# Patient Record
Sex: FEMALE | Race: WHITE | NOT HISPANIC OR LATINO
[De-identification: names, ages, dates, MRNs, and addresses within clinical notes are randomized per-mention and may not be internally consistent; named-entity substitution may affect disease eponyms.]

---

## 2019-08-12 ENCOUNTER — RECORD ABSTRACTING (OUTPATIENT)
Age: 69
End: 2019-08-12

## 2019-08-12 DIAGNOSIS — F17.200 NICOTINE DEPENDENCE, UNSPECIFIED, UNCOMPLICATED: ICD-10-CM

## 2019-08-12 DIAGNOSIS — M71.569 OTHER ENTHESOPATHIES, NOT ELSEWHERE CLASSIFIED: ICD-10-CM

## 2019-08-12 DIAGNOSIS — M77.8 OTHER ENTHESOPATHIES, NOT ELSEWHERE CLASSIFIED: ICD-10-CM

## 2019-08-12 DIAGNOSIS — Z82.5 FAMILY HISTORY OF ASTHMA AND OTHER CHRONIC LOWER RESPIRATORY DISEASES: ICD-10-CM

## 2019-08-12 DIAGNOSIS — Z87.39 PERSONAL HISTORY OF OTHER DISEASES OF THE MUSCULOSKELETAL SYSTEM AND CONNECTIVE TISSUE: ICD-10-CM

## 2019-08-12 DIAGNOSIS — Z72.89 OTHER PROBLEMS RELATED TO LIFESTYLE: ICD-10-CM

## 2019-08-12 DIAGNOSIS — Z80.9 FAMILY HISTORY OF MALIGNANT NEOPLASM, UNSPECIFIED: ICD-10-CM

## 2019-08-12 DIAGNOSIS — E78.5 HYPERLIPIDEMIA, UNSPECIFIED: ICD-10-CM

## 2019-08-12 PROBLEM — Z00.00 ENCOUNTER FOR PREVENTIVE HEALTH EXAMINATION: Status: ACTIVE | Noted: 2019-08-12

## 2019-08-12 RX ORDER — IBUPROFEN 200 MG/1
TABLET, COATED ORAL
Refills: 0 | Status: ACTIVE | COMMUNITY

## 2019-08-13 ENCOUNTER — APPOINTMENT (OUTPATIENT)
Dept: RHEUMATOLOGY | Facility: CLINIC | Age: 69
End: 2019-08-13
Payer: MEDICARE

## 2019-08-13 VITALS
WEIGHT: 154 LBS | BODY MASS INDEX: 28.34 KG/M2 | HEIGHT: 62 IN | DIASTOLIC BLOOD PRESSURE: 64 MMHG | SYSTOLIC BLOOD PRESSURE: 112 MMHG

## 2019-08-13 PROCEDURE — 20605 DRAIN/INJ JOINT/BURSA W/O US: CPT | Mod: 50,LT,RT

## 2019-08-13 PROCEDURE — 99203 OFFICE O/P NEW LOW 30 MIN: CPT | Mod: 25

## 2019-08-13 RX ADMIN — TRIAMCINOLONE ACETONIDE 0 MG/ML: 40 INJECTION, SUSPENSION INTRA-ARTICULAR; INTRAMUSCULAR at 00:00

## 2019-08-13 NOTE — PHYSICAL EXAM
[General Appearance - Alert] : alert [General Appearance - In No Acute Distress] : in no acute distress [General Appearance - Well Developed] : well developed [General Appearance - Well Nourished] : well nourished [Motor Exam] : the motor exam was normal [] : no rash [FreeTextEntry1] : There is no active synovitis. Finkelstein test is positive bilaterally, with some palpable swelling over the right expensor pollicus longus tendon.

## 2019-08-13 NOTE — REASON FOR VISIT
[Initial Evaluation] : an initial evaluation [Spouse] : spouse [FreeTextEntry1] : Bilateral wrist pain

## 2019-08-13 NOTE — REVIEW OF SYSTEMS
[Joint Pain] : joint pain [Joint Stiffness] : joint stiffness [Fever] : no fever [Chills] : no chills [Eye Pain] : no eye pain [Red Eyes] : eyes not red [Abdominal Pain] : no abdominal pain [Diarrhea] : no diarrhea [Joint Swelling] : no joint swelling [Dysuria] : no dysuria [Fainting] : no fainting [Limb Weakness] : no limb weakness

## 2019-08-13 NOTE — PROCEDURE
[FreeTextEntry1] : Lateral aspect of left wrist prepped with betadine; 1% lido applied to subQ tissue; 1cc 1% lido and 1cc Kenalog 40 940 mg) injected in the area of the extensor pollicus longus tendon at the level of the wrist with good results and no complications. \par \par Lateral aspect of the right wrist prepped with betadine, 1% lido applied to subQ tissue; 1cc 1% lido and 1cc Kenalog 40 (40 mg) injected into area of extensor pollicus longus at the level of the wrist with good results and no complications.

## 2019-08-13 NOTE — HISTORY OF PRESENT ILLNESS
[FreeTextEntry1] : Patient reports about 4 to 6 weeks of bilateral wrist pain (points to lateral aspect of both wrists along the extensor pollicus longus tendons). Patient is a , and pain is adversely affecting her employment.

## 2019-08-19 RX ORDER — TRIAMCINOLONE ACETONIDE 40 MG/ML
40 SUSPENSION INTRA-ARTERIAL; INTRAMUSCULAR
Qty: 1 | Refills: 0 | Status: COMPLETED
Start: 2019-08-13

## 2019-08-19 RX ORDER — TRIAMCINOLONE ACETONIDE 40 MG/ML
40 SUSPENSION INTRA-ARTERIAL; INTRAMUSCULAR
Qty: 1 | Refills: 0 | Status: COMPLETED | OUTPATIENT
Start: 2019-08-13

## 2019-11-26 ENCOUNTER — RX RENEWAL (OUTPATIENT)
Age: 69
End: 2019-11-26

## 2019-11-26 DIAGNOSIS — L40.9 PSORIASIS, UNSPECIFIED: ICD-10-CM

## 2020-03-09 ENCOUNTER — APPOINTMENT (OUTPATIENT)
Dept: RHEUMATOLOGY | Facility: CLINIC | Age: 70
End: 2020-03-09

## 2020-03-16 ENCOUNTER — APPOINTMENT (OUTPATIENT)
Dept: RHEUMATOLOGY | Facility: CLINIC | Age: 70
End: 2020-03-16
Payer: MEDICARE

## 2020-03-16 PROCEDURE — 99213 OFFICE O/P EST LOW 20 MIN: CPT | Mod: 25

## 2020-03-16 PROCEDURE — 20610 DRAIN/INJ JOINT/BURSA W/O US: CPT

## 2020-03-16 RX ADMIN — Medication 0 MG/ML: at 00:00

## 2020-03-16 NOTE — PROCEDURE
[FreeTextEntry1] : Right shoulder prepped with betadine; 2% lido applied to subQ tissue; 2cc 2% lido and 3cc Kenalog 40 (120 mg) injected into area of right subacromial bursa, and also into the vicinity of the right supraspinatus tendon with good results and no complications.

## 2020-03-16 NOTE — PHYSICAL EXAM
[General Appearance - Alert] : alert [General Appearance - In No Acute Distress] : in no acute distress [General Appearance - Well Nourished] : well nourished [General Appearance - Well Developed] : well developed [] : no rash [Motor Exam] : the motor exam was normal [FreeTextEntry1] : There is no right GH tenderness or swelling. There is pain on passive abduction of the right shoulder to about 80 degrees - this reproduces patient's CC. Also, there is pain on stressing the right rotator cuff - this also reporduces patient's CC.

## 2020-03-16 NOTE — REVIEW OF SYSTEMS
[Joint Pain] : joint pain [Joint Stiffness] : joint stiffness [Fever] : no fever [Chills] : no chills [Eye Pain] : no eye pain [Red Eyes] : eyes not red [Abdominal Pain] : no abdominal pain [Diarrhea] : no diarrhea [Dysuria] : no dysuria [Joint Swelling] : no joint swelling [Fainting] : no fainting [Limb Weakness] : no limb weakness

## 2020-03-16 NOTE — HISTORY OF PRESENT ILLNESS
[FreeTextEntry1] : Patient comes in with about two months of right shoulder pain, especially while raising her right arm over her head.

## 2020-04-09 RX ORDER — TRIAMCINOLONE ACETONIDE 40 MG/ML
40 SUSPENSION INTRA-ARTERIAL; INTRAMUSCULAR
Qty: 1 | Refills: 0 | Status: COMPLETED | OUTPATIENT
Start: 2020-03-16

## 2020-04-09 RX ORDER — TRIAMCINOLONE ACETONIDE 40 MG/ML
40 SUSPENSION INTRA-ARTERIAL; INTRAMUSCULAR
Qty: 1 | Refills: 0 | Status: COMPLETED
Start: 2020-03-16

## 2020-05-13 ENCOUNTER — TRANSCRIPTION ENCOUNTER (OUTPATIENT)
Age: 70
End: 2020-05-13

## 2020-10-13 ENCOUNTER — TRANSCRIPTION ENCOUNTER (OUTPATIENT)
Age: 70
End: 2020-10-13

## 2021-05-09 RX ORDER — HYDROCORTISONE VALERATE 2 MG/G
0.2 CREAM TOPICAL
Qty: 1 | Refills: 5 | Status: ACTIVE | COMMUNITY
Start: 2019-11-26 | End: 1900-01-01

## 2021-06-09 ENCOUNTER — APPOINTMENT (OUTPATIENT)
Dept: RHEUMATOLOGY | Facility: CLINIC | Age: 71
End: 2021-06-09
Payer: MEDICARE

## 2021-06-09 VITALS
DIASTOLIC BLOOD PRESSURE: 68 MMHG | TEMPERATURE: 98.1 F | HEIGHT: 62 IN | SYSTOLIC BLOOD PRESSURE: 132 MMHG | WEIGHT: 160 LBS | BODY MASS INDEX: 29.44 KG/M2

## 2021-06-09 PROCEDURE — 99213 OFFICE O/P EST LOW 20 MIN: CPT | Mod: 25

## 2021-06-09 PROCEDURE — 20610 DRAIN/INJ JOINT/BURSA W/O US: CPT

## 2021-06-09 NOTE — PHYSICAL EXAM
[General Appearance - Alert] : alert [General Appearance - In No Acute Distress] : in no acute distress [General Appearance - Well Nourished] : well nourished [General Appearance - Well Developed] : well developed [] : no rash [Motor Exam] : the motor exam was normal [FreeTextEntry1] : There is no right GH tenderness or swelling. There is pain on passive abduction of the right shoulder to about 80 degrees - this reproduces patient's CC.

## 2021-06-09 NOTE — PROCEDURE
[FreeTextEntry1] : Right shoulder prepped with betadine; 2% lido applied to subQ tissue; 2cc 2% lido and 3cc Kenalog 40 (120 mg) injected into area of right subacromial bursa with good results and no complications.

## 2021-06-09 NOTE — HISTORY OF PRESENT ILLNESS
[FreeTextEntry1] : Patient comes in requesting repeat steroid injection for recurrence of symptoms suggesting right subacromial bursitis. Last injection was done March 2020 and was very effective.

## 2021-09-10 ENCOUNTER — APPOINTMENT (OUTPATIENT)
Dept: RHEUMATOLOGY | Facility: CLINIC | Age: 71
End: 2021-09-10
Payer: MEDICARE

## 2021-09-10 VITALS
DIASTOLIC BLOOD PRESSURE: 66 MMHG | TEMPERATURE: 98.1 F | OXYGEN SATURATION: 99 % | SYSTOLIC BLOOD PRESSURE: 128 MMHG | BODY MASS INDEX: 29.44 KG/M2 | WEIGHT: 160 LBS | HEIGHT: 62 IN | HEART RATE: 87 BPM

## 2021-09-10 PROCEDURE — 20605 DRAIN/INJ JOINT/BURSA W/O US: CPT

## 2021-09-10 PROCEDURE — 99212 OFFICE O/P EST SF 10 MIN: CPT | Mod: 25

## 2021-09-10 RX ORDER — TRIAMCINOLONE ACETONIDE 40 MG/ML
40 SUSPENSION INTRA-ARTERIAL; INTRAMUSCULAR
Qty: 1 | Refills: 0 | Status: COMPLETED | OUTPATIENT
Start: 2021-09-10 | End: 2021-09-11

## 2021-09-10 RX ADMIN — TRIAMCINOLONE ACETONIDE 0 MG/ML: 40 INJECTION, SUSPENSION INTRA-ARTICULAR; INTRAMUSCULAR at 00:00

## 2021-09-10 NOTE — PHYSICAL EXAM
[General Appearance - Alert] : alert [General Appearance - In No Acute Distress] : in no acute distress [General Appearance - Well Nourished] : well nourished [General Appearance - Well Developed] : well developed [] : no rash [Motor Exam] : the motor exam was normal [FreeTextEntry1] : Ther eis no acute synovitis. There is a positive Finkelstien test on the left.

## 2021-09-10 NOTE — HISTORY OF PRESENT ILLNESS
[FreeTextEntry1] : Here because of symptoms of left DeQuervain's recurring and is requesting a steroid injection.

## 2021-09-10 NOTE — PROCEDURE
[FreeTextEntry1] : Left wrist prepped with betadine; 1% lido applied to subQ tissue; 1/2 cc 1% lido and 1 cc Kenalog 40 (40 mg) injected into the area in the vicinity of the extensor pollicus longus with good results and no complications.

## 2021-10-25 ENCOUNTER — APPOINTMENT (OUTPATIENT)
Dept: RHEUMATOLOGY | Facility: CLINIC | Age: 71
End: 2021-10-25
Payer: MEDICARE

## 2021-10-25 PROCEDURE — 99213 OFFICE O/P EST LOW 20 MIN: CPT | Mod: 25

## 2021-10-25 PROCEDURE — 20610 DRAIN/INJ JOINT/BURSA W/O US: CPT

## 2021-10-25 NOTE — HISTORY OF PRESENT ILLNESS
[FreeTextEntry1] : Having symptoms of recurrence of right subacromial bursitis; requesting repeat steroid injection. Last one done in June was very effective per patient.

## 2022-01-01 DIAGNOSIS — B00.1 HERPESVIRAL VESICULAR DERMATITIS: ICD-10-CM

## 2022-01-05 ENCOUNTER — TRANSCRIPTION ENCOUNTER (OUTPATIENT)
Age: 72
End: 2022-01-05

## 2022-02-07 ENCOUNTER — APPOINTMENT (OUTPATIENT)
Dept: RHEUMATOLOGY | Facility: CLINIC | Age: 72
End: 2022-02-07
Payer: MEDICARE

## 2022-02-07 ENCOUNTER — RESULT REVIEW (OUTPATIENT)
Age: 72
End: 2022-02-07

## 2022-02-07 VITALS
DIASTOLIC BLOOD PRESSURE: 60 MMHG | WEIGHT: 173 LBS | HEART RATE: 71 BPM | HEIGHT: 62 IN | OXYGEN SATURATION: 93 % | SYSTOLIC BLOOD PRESSURE: 110 MMHG | BODY MASS INDEX: 31.83 KG/M2

## 2022-02-07 DIAGNOSIS — M65.4 RADIAL STYLOID TENOSYNOVITIS [DE QUERVAIN]: ICD-10-CM

## 2022-02-07 DIAGNOSIS — M85.811: ICD-10-CM

## 2022-02-07 PROCEDURE — 20610 DRAIN/INJ JOINT/BURSA W/O US: CPT

## 2022-02-07 PROCEDURE — 99214 OFFICE O/P EST MOD 30 MIN: CPT | Mod: 25

## 2022-02-07 RX ORDER — TRIAMCINOLONE ACETONIDE 40 MG/ML
40 SUSPENSION INTRA-ARTERIAL; INTRAMUSCULAR
Qty: 1 | Refills: 0 | Status: COMPLETED | OUTPATIENT
Start: 2022-02-07 | End: 2022-02-08

## 2022-02-07 RX ORDER — TRIAMCINOLONE ACETONIDE 40 MG/ML
40 SUSPENSION INTRA-ARTERIAL; INTRAMUSCULAR
Qty: 1 | Refills: 0 | Status: COMPLETED
Start: 2022-02-07 | End: 2022-02-08

## 2022-02-07 RX ORDER — CLOBETASOL PROPIONATE 0.5 MG/G
0.05 GEL TOPICAL
Refills: 0 | Status: DISCONTINUED | COMMUNITY
End: 2022-02-07

## 2022-02-07 RX ADMIN — TRIAMCINOLONE ACETONIDE 0 MG/ML: 40 INJECTION, SUSPENSION INTRA-ARTICULAR; INTRAMUSCULAR at 00:00

## 2022-02-07 NOTE — PHYSICAL EXAM
[General Appearance - Alert] : alert [General Appearance - In No Acute Distress] : in no acute distress [General Appearance - Well Nourished] : well nourished [General Appearance - Well Developed] : well developed [] : no rash [Motor Exam] : the motor exam was normal [FreeTextEntry1] : There is no right GH tenderness or swelling. There is no pain on passive abduction of the right shoulder to about 100 degrees. There is tenderness of the proximal right biceps tendon - this reproduces patient's CC. There is pain on stressing the right rotator cuff - this also reproduces patient's CC.

## 2022-02-07 NOTE — PROCEDURE
[FreeTextEntry1] : Right shoulder prepped with betadine; 2% lido applied to subQ tissue; 2cc 2% lido and 3cc Kenalog 40 (80 mg) injected in the vicinity of the right supraspinatus tendon (rotator cuff) with good results and no complications. \par \par Right upper arm prepped with betadine; 1% lido applied to subQ tissue; 2cc 1% lido and 2cc Kenalog 40 980 mg) injected in the vicinity of the proximal right long head biceps tendon with good results and no complications.

## 2022-02-23 ENCOUNTER — APPOINTMENT (OUTPATIENT)
Dept: RADIOLOGY | Facility: CLINIC | Age: 72
End: 2022-02-23
Payer: MEDICARE

## 2022-02-23 ENCOUNTER — OUTPATIENT (OUTPATIENT)
Dept: OUTPATIENT SERVICES | Facility: HOSPITAL | Age: 72
LOS: 1 days | End: 2022-02-23
Payer: MEDICARE

## 2022-02-23 ENCOUNTER — RESULT REVIEW (OUTPATIENT)
Age: 72
End: 2022-02-23

## 2022-02-23 DIAGNOSIS — M85.811 OTHER SPECIFIED DISORDERS OF BONE DENSITY AND STRUCTURE, RIGHT SHOULDER: ICD-10-CM

## 2022-02-23 PROCEDURE — 77086 VRT FRACTURE ASSMT VIA DXA: CPT | Mod: 26

## 2022-02-23 PROCEDURE — 77086 VRT FRACTURE ASSMT VIA DXA: CPT

## 2022-07-25 ENCOUNTER — APPOINTMENT (OUTPATIENT)
Dept: MRI IMAGING | Facility: CLINIC | Age: 72
End: 2022-07-25

## 2022-07-25 ENCOUNTER — OUTPATIENT (OUTPATIENT)
Dept: OUTPATIENT SERVICES | Facility: HOSPITAL | Age: 72
LOS: 1 days | End: 2022-07-25
Payer: MEDICARE

## 2022-07-25 DIAGNOSIS — M25.511 PAIN IN RIGHT SHOULDER: ICD-10-CM

## 2022-07-25 PROCEDURE — 73221 MRI JOINT UPR EXTREM W/O DYE: CPT | Mod: MH

## 2022-07-25 PROCEDURE — 73221 MRI JOINT UPR EXTREM W/O DYE: CPT | Mod: 26,RT,MH

## 2022-08-04 ENCOUNTER — APPOINTMENT (OUTPATIENT)
Dept: ORTHOPEDIC SURGERY | Facility: CLINIC | Age: 72
End: 2022-08-04

## 2022-08-04 VITALS — BODY MASS INDEX: 31.83 KG/M2 | HEIGHT: 62 IN | WEIGHT: 173 LBS

## 2022-08-04 PROCEDURE — 99213 OFFICE O/P EST LOW 20 MIN: CPT

## 2022-08-04 NOTE — HISTORY OF PRESENT ILLNESS
[9] : 9 [Burning] : burning [Radiating] : radiating [Sharp] : sharp [Shooting] : shooting [de-identified] : 8/4/22:  Pt's right shoulder has been hurting since December.  She had an MRI and was told she had a torn rotator cuff.  She has had CSI in spring that lasted 1.5 weeks. \hilary Macdonald [] : no [FreeTextEntry1] : right shoulder [FreeTextEntry7] : down the arm

## 2022-08-04 NOTE — ASSESSMENT
[FreeTextEntry1] : The patient was advised of the diagnosis. The natural history of the pathology was explained in full to the patient in layman's terms. All questions were answered. The risks and benefits of surgical and non-surgical treatment alternatives were explained in full to the patient.\par \par Pt will take Medrol dose pack.\par \par Pt will f/u with Dr Cabezas in a week.

## 2022-08-04 NOTE — IMAGING
[de-identified] : Right shoulder:\par No TTP\par No bony deformity\par abduction 70\par Internal rotation L3\par Forward flexion 80\par supraspinatus is very painful\par NVID\par

## 2022-08-08 ENCOUNTER — APPOINTMENT (OUTPATIENT)
Dept: ORTHOPEDIC SURGERY | Facility: CLINIC | Age: 72
End: 2022-08-08

## 2022-08-08 VITALS — HEIGHT: 62 IN | WEIGHT: 173 LBS | BODY MASS INDEX: 31.83 KG/M2

## 2022-08-08 PROCEDURE — 99213 OFFICE O/P EST LOW 20 MIN: CPT

## 2022-08-08 NOTE — HISTORY OF PRESENT ILLNESS
[9] : 9 [7] : 7 [Burning] : burning [Radiating] : radiating [Sharp] : sharp [Shooting] : shooting [Constant] : constant [Leisure] : leisure [Sleep] : sleep [Heat] : heat [] : no [FreeTextEntry3] : December 2021 [FreeTextEntry5] : Patient is here today for a new consultation of right shoulder. Patient states there has been no improvement in pain since last visit. Pain is constant for patient. Patient states they have a limited range of motion.\par \par  [FreeTextEntry7] : right shoulder up to her neck and down to her wrist [de-identified] : 8/4/22 [de-identified] : Dr. Roberson [de-identified] : xray, mri [de-identified] : MRI

## 2022-08-08 NOTE — DISCUSSION/SUMMARY
[de-identified] : General Dx discussion\par The patient was advised of the diagnosis. The natural history of the pathology was explained in full to the patient in layman's terms. All questions were answered. The risks and benefits of surgical and non-surgical treatment alternatives were explained in full to the patient. \par \par Case discussed.\par Recommend an arthroscopy with RTC repair, SAD and debridement.\par Risks, benefits and alternatives discussed..\par Risks include, but are not limited to infection, blood clot, nerve damage, loss of motion, continued pain worsened pain, need for another surgery in the future. \par Questions answered.\par The patient expressed understanding. She would like to wait until January to have surgery.\par Discussed the possibility of the tear getting worse over time.\par Follow up in 2-3 months, sooner if she wants to proceed sooner.\par \par Entered by ELIN Lyles acting as scribe.\par \par -\par The documentation recorded by the scribe accurately reflects the service I personally performed and the decisions made by me.\par

## 2022-08-08 NOTE — PHYSICAL EXAM
[Right] : right shoulder [Sitting] : sitting [4 ___] : forward flexion 4[unfilled]/5 [4___] : abduction 4[unfilled]/5 [5___] : internal rotation 5[unfilled]/5 [] : no erythema [TWNoteComboBox7] : active forward flexion 85 degrees [TWNoteComboBox4] : passive forward flexion 120 degrees [de-identified] : active abduction 75 degrees [TWNoteComboBox6] : internal rotation 60 degrees [de-identified] : external rotation 45 degrees

## 2022-08-08 NOTE — REASON FOR VISIT
Pt requesting medication refill. Pending orders.    Please Advise.    
[FreeTextEntry2] : 72yo RHD female with right shoulder pain. She fell in November onto her right side. She saw a rheumatologist and had cortisone injections in February which helped for about 2 weeks. Pain persists.

## 2022-08-08 NOTE — DATA REVIEWED
[MRI] : MRI [Right] : of the right [Shoulder] : shoulder [Report was reviewed and noted in the chart] : The report was reviewed and noted in the chart [I reviewed the films/CD] : I reviewed the films/CD [FreeTextEntry1] : 1.Mild to moderate RTC tendinopathy with intrasubstance tearing and focal full thickness tear.\par 2.Severe infraspinatus muscle atrophy.

## 2022-11-03 ENCOUNTER — APPOINTMENT (OUTPATIENT)
Dept: ORTHOPEDIC SURGERY | Facility: CLINIC | Age: 72
End: 2022-11-03

## 2022-11-03 VITALS — BODY MASS INDEX: 27.6 KG/M2 | WEIGHT: 150 LBS | HEIGHT: 62 IN

## 2022-11-03 DIAGNOSIS — M25.511 PAIN IN RIGHT SHOULDER: ICD-10-CM

## 2022-11-03 DIAGNOSIS — M75.21 BICIPITAL TENDINITIS, RIGHT SHOULDER: ICD-10-CM

## 2022-11-03 DIAGNOSIS — G89.29 PAIN IN RIGHT SHOULDER: ICD-10-CM

## 2022-11-03 PROCEDURE — 99214 OFFICE O/P EST MOD 30 MIN: CPT

## 2022-11-04 NOTE — DISCUSSION/SUMMARY
[de-identified] : General Dx discussion\par The patient was advised of the diagnosis. The natural history of the pathology was explained in full to the patient in layman's terms. All questions were answered. The risks and benefits of surgical and non-surgical treatment alternatives were explained in full to the patient. \par \par Case discussed.\par Case Discussed.\par MRI reviewed, has with pain and loss of motion \par Arthroscopy with decompression, debridement RCR discussed\par Risks, benefits and alternatives discussed. Risks include, but are not limited to infection, blood clot, nerve damage, recurrent tear, loss of motion, continued pain, worsened pain, need for another surgery in the future. Recurrent tear discussed \par Discussed that the surgery will not address any pain that she has from any OA she may have. She understands she will not be 100% better after surgery. Prolonged immobilization and rehabilitation discussed. Work limitations discussed.\par Questions answered.\par She expressed understanding and would like to proceed.\par \par She would like to wait until January to have surgery.\par Discussed the possibility of the tear getting worse over time.\par Follow up in 2-3 months, sooner if she wants to proceed sooner.\par \par The patient was advised of the diagnosis.  The natural history of the pathology was explained in full to the patient in layman's terms. All questions were answered.  The risks and benefits of surgical and non-surgical treatment alternatives were explained in full to the patient.  \par The patient demonstrated a full understanding of the surgical and non-surgical options.  The risks of surgery were outlined in full to the patient including but not limited to bleeding, scarring, infection, sepsis, neurologic injury, vascular injury, failure to resolve symptoms, symptom recurrence, the need for further surgery, non-healing, wound breakdown, deep vein thrombosis, pulmonary embolism, spontaneous osteonecrosis, anesthesia complications and even death.  The patient understood all the risks and accepted them and understood that other complications could occur that are not mentioned above.  The intraoperative plan, post-operative plan, post-operative expectations and limitations were explained in full.  Expectations from non-surgical treatment were explained in full as well.  The patient demonstrated a complete understanding of the treatment alternatives and requested the above-mentioned procedure.  This will be scheduled accordingly.\par \par \par Entered by ELIN Nascimento acting as scribe.\par \par -\par The documentation recorded by the scribe accurately reflects the service I personally performed and the decisions made by me.\par

## 2022-11-04 NOTE — HISTORY OF PRESENT ILLNESS
[7] : 7 [Dull/Aching] : dull/aching [Constant] : constant [Household chores] : household chores [Leisure] : leisure [Work] : work [Sleep] : sleep [Part time] : Work status: part time [9] : 9 [Burning] : burning [Radiating] : radiating [Sharp] : sharp [Shooting] : shooting [Heat] : heat [de-identified] : Patient is here for a follow up on her right shoulder. She would like to discuss surgery.  [] : no [FreeTextEntry1] : right shoulder [FreeTextEntry3] : December 2021 [FreeTextEntry5] : Patient is here today for a new consultation of right shoulder. Patient states there has been no improvement in pain since last visit. Pain is constant for patient. Patient states they have a limited range of motion.\par \par  [FreeTextEntry7] : right shoulder up to her neck and down to her wrist [FreeTextEntry9] : Advil [de-identified] : 8/4/22 [de-identified] : Dr. Roberson [de-identified] : xray, mri [de-identified] : MRI

## 2022-11-04 NOTE — PHYSICAL EXAM
[Right] : right shoulder [Sitting] : sitting [4 ___] : forward flexion 4[unfilled]/5 [4___] : abduction 4[unfilled]/5 [5___] : internal rotation 5[unfilled]/5 [] : no erythema [Moderate] : moderate [TWNoteComboBox7] : active forward flexion 80 degrees [TWNoteComboBox4] : passive forward flexion 120 degrees [de-identified] : active abduction 75 degrees [TWNoteComboBox6] : internal rotation 60 degrees [de-identified] : external rotation 45 degrees

## 2022-11-04 NOTE — REASON FOR VISIT
[FreeTextEntry2] : Follow up-right shoulder reports cont pain\par she has cont pain wants to schedule surgery \par has spoken to her brother who is a doctor

## 2022-11-10 ENCOUNTER — APPOINTMENT (OUTPATIENT)
Dept: RHEUMATOLOGY | Facility: CLINIC | Age: 72
End: 2022-11-10

## 2022-11-10 VITALS
WEIGHT: 150 LBS | OXYGEN SATURATION: 99 % | BODY MASS INDEX: 27.6 KG/M2 | SYSTOLIC BLOOD PRESSURE: 126 MMHG | HEIGHT: 62 IN | DIASTOLIC BLOOD PRESSURE: 60 MMHG | HEART RATE: 70 BPM

## 2022-11-10 DIAGNOSIS — L30.9 DERMATITIS, UNSPECIFIED: ICD-10-CM

## 2022-11-10 DIAGNOSIS — M65.9 SYNOVITIS AND TENOSYNOVITIS, UNSPECIFIED: ICD-10-CM

## 2022-11-10 PROCEDURE — 99213 OFFICE O/P EST LOW 20 MIN: CPT | Mod: 25

## 2022-11-10 PROCEDURE — 20610 DRAIN/INJ JOINT/BURSA W/O US: CPT

## 2022-11-10 RX ORDER — TRIAMCINOLONE ACETONIDE 40 MG/ML
40 SUSPENSION INTRA-ARTERIAL; INTRAMUSCULAR
Qty: 1 | Refills: 0 | Status: COMPLETED | OUTPATIENT
Start: 2022-11-10 | End: 2022-11-11

## 2022-11-10 RX ORDER — TRIAMCINOLONE ACETONIDE 40 MG/ML
40 SUSPENSION INTRA-ARTERIAL; INTRAMUSCULAR
Qty: 1 | Refills: 0 | Status: COMPLETED
Start: 2022-11-10 | End: 2022-11-11

## 2022-11-10 RX ORDER — HYDROCORTISONE VALERATE 2 MG/G
0.2 CREAM TOPICAL 3 TIMES DAILY
Qty: 2 | Refills: 1 | Status: ACTIVE | COMMUNITY
Start: 2022-11-10 | End: 1900-01-01

## 2022-11-10 RX ORDER — CLOBETASOL PROPIONATE 0.5 MG/ML
0.05 SOLUTION TOPICAL DAILY
Qty: 2 | Refills: 2 | Status: ACTIVE | COMMUNITY
Start: 2022-11-10 | End: 1900-01-01

## 2022-11-10 RX ADMIN — TRIAMCINOLONE ACETONIDE 0 MG/ML: 40 SUSPENSION INTRA-ARTERIAL; INTRAMUSCULAR at 00:00

## 2022-11-10 NOTE — PHYSICAL EXAM
[General Appearance - Alert] : alert [General Appearance - In No Acute Distress] : in no acute distress [General Appearance - Well Nourished] : well nourished [General Appearance - Well Developed] : well developed [Motor Exam] : the motor exam was normal [FreeTextEntry1] : There are changes of eczema of the scalp.

## 2022-11-10 NOTE — PROCEDURE
[FreeTextEntry1] : Left shoulder: Prepped with betadine; 1% lido applied to subQ tissue; 2cc 1% lido and 2cc Kenalog 40 (80 mg) injected into area of left subacromial bursa with good results and no complications. \par \par Right knee: Prepped with betadine; 1% lido applied to subQ tissue; 2cc 1% lido and 2cc Kenalog 40 (80 mg) injected into right knee with good results and no complications.

## 2022-11-10 NOTE — HISTORY OF PRESENT ILLNESS
[FreeTextEntry1] : Patient has developed progressive left shoulder and right knee pain, with swelling of the right knee. There is significant AM stiffness in the right knee. she reports the last time the right knee was injected with steroid was about 8 years ago. No other joints are involved.  Asking for renewal of topical medications for eczema.

## 2022-12-07 ENCOUNTER — FORM ENCOUNTER (OUTPATIENT)
Age: 72
End: 2022-12-07

## 2023-01-04 ENCOUNTER — APPOINTMENT (OUTPATIENT)
Age: 73
End: 2023-01-04
Payer: MEDICARE

## 2023-01-04 PROCEDURE — 29827 SHO ARTHRS SRG RT8TR CUF RPR: CPT | Mod: RT

## 2023-01-04 PROCEDURE — 29827 SHO ARTHRS SRG RT8TR CUF RPR: CPT | Mod: AS,RT

## 2023-01-04 PROCEDURE — 29823 SHO ARTHRS SRG XTNSV DBRDMT: CPT | Mod: AS,59,RT

## 2023-01-04 PROCEDURE — 29826 SHO ARTHRS SRG DECOMPRESSION: CPT | Mod: AS,RT

## 2023-01-04 PROCEDURE — 29823 SHO ARTHRS SRG XTNSV DBRDMT: CPT | Mod: 59,RT

## 2023-01-04 PROCEDURE — 29826 SHO ARTHRS SRG DECOMPRESSION: CPT | Mod: RT

## 2023-01-04 RX ORDER — OXYCODONE AND ACETAMINOPHEN 5; 325 MG/1; MG/1
5-325 TABLET ORAL
Qty: 30 | Refills: 0 | Status: COMPLETED | COMMUNITY
Start: 2023-01-04 | End: 2023-01-09

## 2023-01-05 ENCOUNTER — NON-APPOINTMENT (OUTPATIENT)
Age: 73
End: 2023-01-05

## 2023-01-12 ENCOUNTER — APPOINTMENT (OUTPATIENT)
Dept: ORTHOPEDIC SURGERY | Facility: CLINIC | Age: 73
End: 2023-01-12
Payer: MEDICARE

## 2023-01-12 DIAGNOSIS — S46.011A STRAIN OF MUSCLE(S) AND TENDON(S) OF THE ROTATOR CUFF OF RIGHT SHOULDER, INITIAL ENCOUNTER: ICD-10-CM

## 2023-01-12 PROCEDURE — 99024 POSTOP FOLLOW-UP VISIT: CPT

## 2023-01-12 NOTE — DISCUSSION/SUMMARY
[de-identified] : Case Discussed.\par Surgical Pictures reviewed. \par Continue immobilizer at all times. \par Will go for a course of physical therapy for passive rom only. \par \par \par Entered by Minerva WORTHINGTON acting as a scribe.\par Instructions: Dr. Cabezas- The documentation recorded by the scribe accurately reflects the service I personally performed and the decisions made by me.\par

## 2023-01-12 NOTE — HISTORY OF PRESENT ILLNESS
[8] : 8 [4] : 4 [Dull/Aching] : dull/aching [Constant] : constant [] : Post Surgical Visit: yes [de-identified] : Here for f/u right shoulder s/p arthroscopy, debridment, decompression and rcr on 1/4/23. She is wearing the immobilizer and doing well. She reports mild pain.  [FreeTextEntry1] : right shoulder [FreeTextEntry7] : to her forearm [FreeTextEntry9] : Advil [de-identified] : none [de-identified] : 1/4/23 [de-identified] : right shoulder arthroscopy

## 2023-01-26 ENCOUNTER — APPOINTMENT (OUTPATIENT)
Dept: ORTHOPEDIC SURGERY | Facility: CLINIC | Age: 73
End: 2023-01-26
Payer: MEDICARE

## 2023-01-26 DIAGNOSIS — M75.91 SHOULDER LESION, UNSPECIFIED, RIGHT SHOULDER: ICD-10-CM

## 2023-01-26 DIAGNOSIS — Z98.890 OTHER SPECIFIED POSTPROCEDURAL STATES: ICD-10-CM

## 2023-01-26 PROCEDURE — 99024 POSTOP FOLLOW-UP VISIT: CPT

## 2023-01-26 NOTE — HISTORY OF PRESENT ILLNESS
[8] : 8 [4] : 4 [Dull/Aching] : dull/aching [Constant] : constant [] : Post Surgical Visit: yes [de-identified] : Here for f/u right shoulder s/p arthroscopy, debridment, decompression and rcr on 1/4/23. She is wearing the immobilizer today and she reports doing well. She states she does not wear her immobilizer at home. She is going to physical therapy and she states she has been moving her arm a lot at therapy.  [FreeTextEntry1] : right shoulder [FreeTextEntry7] : to her forearm [FreeTextEntry9] : Advil [de-identified] : none [de-identified] : 1/4/23 [de-identified] : right shoulder arthroscopy

## 2023-01-26 NOTE — PHYSICAL EXAM
[Right] : right shoulder [] : shoulder immobilizer in place [TWNoteComboBox4] : passive forward flexion 90 degrees

## 2023-01-26 NOTE — DISCUSSION/SUMMARY
[de-identified] : Case Discussed.\par Advised to continue immobilizer.\par Will advance PROM at therapy. \par f/u 2 weeks. \par \par \par Entered by Minerva WORTHINGTON acting as a scribe.\par Instructions: Dr. Cabezas- The documentation recorded by the scribe accurately reflects the service I personally performed and the decisions made by me.\par

## 2023-02-09 ENCOUNTER — APPOINTMENT (OUTPATIENT)
Dept: ORTHOPEDIC SURGERY | Facility: CLINIC | Age: 73
End: 2023-02-09
Payer: MEDICARE

## 2023-02-09 VITALS — BODY MASS INDEX: 27.6 KG/M2 | HEIGHT: 62 IN | WEIGHT: 150 LBS

## 2023-02-09 DIAGNOSIS — S46.011D STRAIN OF MUSCLE(S) AND TENDON(S) OF THE ROTATOR CUFF OF RIGHT SHOULDER, SUBSEQUENT ENCOUNTER: ICD-10-CM

## 2023-02-09 PROCEDURE — 99024 POSTOP FOLLOW-UP VISIT: CPT

## 2023-02-09 NOTE — HISTORY OF PRESENT ILLNESS
[1] : 2 [0] : 0 [Dull/Aching] : dull/aching [Occasional] : occasional [Leisure] : leisure [Heat] : heat [Physical therapy] : physical therapy [] : Post Surgical Visit: yes [FreeTextEntry1] : right shoulder [FreeTextEntry5] : PALMER is a 72 year old F who is here today for post op of right shoulder. Patient states that the pain has improved since last visit. Patient has been doing physical therapy 2x a week [FreeTextEntry7] : to her forearm [FreeTextEntry9] : Advil [de-identified] : pt 2x a week\par HEP [de-identified] : 1/4/23 [de-identified] : right shoulder arthroscopy

## 2023-02-09 NOTE — PHYSICAL EXAM
[Right] : right shoulder [] : limited range of motion compatible with recent surgery [TWNoteComboBox4] : passive forward flexion 120 degrees

## 2023-02-09 NOTE — DISCUSSION/SUMMARY
[de-identified] : General Dx Discussion\par The patient was advised of the diagnosis. The natural history of the pathology was explained in full to the patient in layman's terms. All questions were answered. The risks and benefits of surgical and non-surgical treatment alternatives were explained in full to the patient.\par \par f/u 4 weeks \par PT active rom no lifting \par questions answered

## 2023-02-27 ENCOUNTER — APPOINTMENT (OUTPATIENT)
Dept: ORTHOPEDIC SURGERY | Facility: CLINIC | Age: 73
End: 2023-02-27
Payer: MEDICARE

## 2023-02-27 VITALS — HEIGHT: 62 IN | WEIGHT: 150 LBS | BODY MASS INDEX: 27.6 KG/M2

## 2023-02-27 PROCEDURE — 99024 POSTOP FOLLOW-UP VISIT: CPT

## 2023-02-27 NOTE — DISCUSSION/SUMMARY
[de-identified] : General Dx Discussion\par The patient was advised of the diagnosis. The natural history of the pathology was explained in full to the patient in layman's terms. All questions were answered. The risks and benefits of surgical and non-surgical treatment alternatives were explained in full to the patient.\par \par will f/u 4-6 weeks \par cont PT

## 2023-02-27 NOTE — PHYSICAL EXAM
[Right] : right shoulder [Standing] : standing [Minimal] : minimal [5 ___] : forward flexion 5[unfilled]/5 [5___] : internal rotation 5[unfilled]/5 [] : negative drop-arm test [TWNoteComboBox7] : active forward flexion 140 degrees [TWNoteComboBox4] : False [de-identified] : active abduction 110 degrees

## 2023-02-27 NOTE — HISTORY OF PRESENT ILLNESS
[0] : 0 [Part time] : Work status: part time [de-identified] : Patient is here for a follow up on her right shoulder. Right shoulder arthroscopy 1/4/23. [FreeTextEntry1] : Right shoulder [de-identified] : Physical therapy 2 x a week, HEP.

## 2023-04-10 ENCOUNTER — APPOINTMENT (OUTPATIENT)
Dept: ORTHOPEDIC SURGERY | Facility: CLINIC | Age: 73
End: 2023-04-10
Payer: MEDICARE

## 2023-04-10 VITALS — WEIGHT: 150 LBS | BODY MASS INDEX: 27.6 KG/M2 | HEIGHT: 62 IN

## 2023-04-10 PROCEDURE — 99213 OFFICE O/P EST LOW 20 MIN: CPT

## 2023-04-10 NOTE — PHYSICAL EXAM
[Right] : right shoulder [Standing] : standing [Trace] : trace [5 ___] : forward flexion 5[unfilled]/5 [5___] : internal rotation 5[unfilled]/5 [] : negative drop-arm test [TWNoteComboBox7] : active forward flexion 155 degrees [de-identified] : active abduction 130 degrees [TWNoteComboBox6] : internal rotation 65 degrees [de-identified] : external rotation 70 degrees

## 2023-04-10 NOTE — DISCUSSION/SUMMARY
[de-identified] : General Dx Discussion\par The patient was advised of the diagnosis. The natural history of the pathology was explained in full to the patient in layman's terms. All questions were answered. The risks and benefits of surgical and non-surgical treatment alternatives were explained in full to the patient.\par \par will cont HEP \par f/u 2-3 months

## 2023-04-10 NOTE — REASON FOR VISIT
[FreeTextEntry2] : Follow up-right shoulder doing well,Physical therapy has helped, finished PT last week.

## 2023-04-10 NOTE — HISTORY OF PRESENT ILLNESS
[0] : 0 [Part time] : Work status: part time [de-identified] : Patient is here for a follow up on her right shoulder. Right shoulder arthroscopy 1/4/23. [] : Post Surgical Visit: no [FreeTextEntry1] : Right shoulder [de-identified] : 1/4/23 [de-identified] : Physical therapy 2 x a week, HEP. \par  [de-identified] : 1/4/23 [de-identified] : Rt RCT

## 2023-07-10 ENCOUNTER — APPOINTMENT (OUTPATIENT)
Dept: ORTHOPEDIC SURGERY | Facility: CLINIC | Age: 73
End: 2023-07-10
Payer: MEDICARE

## 2023-07-10 VITALS — WEIGHT: 150 LBS | HEIGHT: 62 IN | BODY MASS INDEX: 27.6 KG/M2

## 2023-07-10 DIAGNOSIS — M75.121 COMPLETE ROTATOR CUFF TEAR OR RUPTURE OF RIGHT SHOULDER, NOT SPECIFIED AS TRAUMATIC: ICD-10-CM

## 2023-07-10 PROCEDURE — 99213 OFFICE O/P EST LOW 20 MIN: CPT

## 2023-07-10 NOTE — HISTORY OF PRESENT ILLNESS
[0] : 0 [Part time] : Work status: part time [de-identified] : 72 year old female here for a follow up on her R shoulder. DOS:1/4/23 Right shoulder arthroscopy. Patient states no pain in the R shoulder.  [] : Post Surgical Visit: no [FreeTextEntry1] : Right shoulder [de-identified] : 1/4/23 [de-identified] : april 2023 [de-identified] : Physical therapy 2 x a week in april 2023\par  HEP \par  [de-identified] : 1/4/23 [de-identified] : Rt RCT

## 2023-07-10 NOTE — REASON FOR VISIT
[FreeTextEntry2] : Follow up-right shoulder \par reports no pain reports her preop pain has decreased nearly 100 %

## 2023-07-10 NOTE — DISCUSSION/SUMMARY
[de-identified] : General Dx Discussion\par The patient was advised of the diagnosis. The natural history of the pathology was explained in full to the patient in layman's terms. All questions were answered. The risks and benefits of surgical and non-surgical treatment alternatives were explained in full to the patient.\par \par will cont HEP \par f/u prn

## 2023-07-10 NOTE — PHYSICAL EXAM
[Right] : right shoulder [Standing] : standing [Trace] : trace [5 ___] : forward flexion 5[unfilled]/5 [5___] : internal rotation 5[unfilled]/5 [] : negative drop-arm test [TWNoteComboBox7] : active forward flexion 160 degrees [de-identified] : active abduction 150 degrees [TWNoteComboBox6] : internal rotation 70 degrees [de-identified] : external rotation 80 degrees

## 2023-07-17 ENCOUNTER — APPOINTMENT (OUTPATIENT)
Dept: RHEUMATOLOGY | Facility: CLINIC | Age: 73
End: 2023-07-17
Payer: MEDICARE

## 2023-07-17 ENCOUNTER — APPOINTMENT (OUTPATIENT)
Dept: ORTHOPEDIC SURGERY | Facility: CLINIC | Age: 73
End: 2023-07-17
Payer: MEDICARE

## 2023-07-17 VITALS — WEIGHT: 150 LBS | HEIGHT: 62 IN | BODY MASS INDEX: 27.6 KG/M2

## 2023-07-17 VITALS
SYSTOLIC BLOOD PRESSURE: 110 MMHG | BODY MASS INDEX: 27.6 KG/M2 | OXYGEN SATURATION: 97 % | HEART RATE: 70 BPM | WEIGHT: 150 LBS | TEMPERATURE: 98 F | DIASTOLIC BLOOD PRESSURE: 60 MMHG | HEIGHT: 62 IN

## 2023-07-17 DIAGNOSIS — M75.52 BURSITIS OF LEFT SHOULDER: ICD-10-CM

## 2023-07-17 DIAGNOSIS — M72.0 PALMAR FASCIAL FIBROMATOSIS [DUPUYTREN]: ICD-10-CM

## 2023-07-17 DIAGNOSIS — M17.0 BILATERAL PRIMARY OSTEOARTHRITIS OF KNEE: ICD-10-CM

## 2023-07-17 DIAGNOSIS — M75.51 BURSITIS OF RIGHT SHOULDER: ICD-10-CM

## 2023-07-17 PROCEDURE — 20610 DRAIN/INJ JOINT/BURSA W/O US: CPT | Mod: 50

## 2023-07-17 PROCEDURE — 73130 X-RAY EXAM OF HAND: CPT | Mod: LT

## 2023-07-17 PROCEDURE — 99213 OFFICE O/P EST LOW 20 MIN: CPT

## 2023-07-17 PROCEDURE — 99214 OFFICE O/P EST MOD 30 MIN: CPT | Mod: 25

## 2023-07-17 RX ORDER — TRIAMCINOLONE ACETONIDE 40 MG/ML
40 SUSPENSION INTRA-ARTERIAL; INTRAMUSCULAR
Qty: 1 | Refills: 0 | Status: COMPLETED | OUTPATIENT
Start: 2023-07-17 | End: 2023-07-18

## 2023-07-17 RX ORDER — METHYLPREDNISOLONE 4 MG/1
4 TABLET ORAL
Qty: 1 | Refills: 0 | Status: DISCONTINUED | COMMUNITY
Start: 2022-08-04 | End: 2023-07-17

## 2023-07-17 RX ORDER — TRIAMCINOLONE ACETONIDE 40 MG/ML
40 SUSPENSION INTRA-ARTERIAL; INTRAMUSCULAR
Qty: 1 | Refills: 0 | Status: COMPLETED
Start: 2023-07-17 | End: 2023-07-18

## 2023-07-17 RX ORDER — VALACYCLOVIR 1 G/1
1 TABLET, FILM COATED ORAL 3 TIMES DAILY
Qty: 21 | Refills: 2 | Status: DISCONTINUED | COMMUNITY
Start: 2022-01-01 | End: 2023-07-17

## 2023-07-17 RX ADMIN — TRIAMCINOLONE ACETONIDE 0 MG/ML: 40 SUSPENSION INTRA-ARTERIAL; INTRAMUSCULAR at 00:00

## 2023-07-17 NOTE — PROCEDURE
[FreeTextEntry1] : Left shoulder: Prepped with betadine; 1% lido applied to subQ tissue; 2cc 1% lido and 2cc Kenalog 40 (80 mg) injected into area of left subacromial bursa with good results and no complications. \par \par Right shoulder: Prepped with betadine; 1% lido applied to subQ tissue; 2cc 1% lido and 2cc Kenalog 40 (80 mg) injected into area of right subacromial bursa with good results and no complications. \par \par Right knee: Prepped with betadine; 1% lido applied to subQ tissue; 2cc 1% lido and 2cc Kenalog 40 (80 mg) injected into right knee with good results and no complications.

## 2023-07-17 NOTE — HISTORY OF PRESENT ILLNESS
[FreeTextEntry1] : Patient presents with several weeks of bilateral shoulder pain and right knee pain and swelling. she is requesting steroid injections of all three areas.

## 2023-07-17 NOTE — HISTORY OF PRESENT ILLNESS
[de-identified] : 7/17/23:  Pt reports that she has "lumps" in the palms of her b/l hands and they feel stiff [FreeTextEntry1] : Right Thumb

## 2023-07-17 NOTE — IMAGING
[de-identified] : left hand:\par cords in 1st, 3rd, and 4th webspace\par farom\par nvid\par \par right hand:\par cords in 3rd and 4th webspace\par farom\par nvid

## 2023-07-17 NOTE — REASON FOR VISIT
[Acute] : an acute visit [Spouse] : spouse [FreeTextEntry1] : Right knee and bilateral shoulder pain

## 2023-08-20 ENCOUNTER — NON-APPOINTMENT (OUTPATIENT)
Age: 73
End: 2023-08-20

## 2023-09-11 ENCOUNTER — APPOINTMENT (OUTPATIENT)
Dept: MRI IMAGING | Facility: CLINIC | Age: 73
End: 2023-09-11
Payer: MEDICARE

## 2023-09-11 ENCOUNTER — OUTPATIENT (OUTPATIENT)
Dept: OUTPATIENT SERVICES | Facility: HOSPITAL | Age: 73
LOS: 1 days | End: 2023-09-11
Payer: MEDICARE

## 2023-09-11 DIAGNOSIS — M17.0 BILATERAL PRIMARY OSTEOARTHRITIS OF KNEE: ICD-10-CM

## 2023-09-11 PROCEDURE — 73721 MRI JNT OF LWR EXTRE W/O DYE: CPT

## 2023-09-11 PROCEDURE — 73721 MRI JNT OF LWR EXTRE W/O DYE: CPT | Mod: 26,RT,MH

## 2023-10-16 ENCOUNTER — APPOINTMENT (OUTPATIENT)
Dept: ORTHOPEDIC SURGERY | Facility: CLINIC | Age: 73
End: 2023-10-16
Payer: MEDICARE

## 2023-10-16 VITALS — WEIGHT: 150 LBS | HEIGHT: 62 IN | BODY MASS INDEX: 27.6 KG/M2

## 2023-10-16 PROCEDURE — 99214 OFFICE O/P EST MOD 30 MIN: CPT

## 2023-10-30 ENCOUNTER — APPOINTMENT (OUTPATIENT)
Dept: ORTHOPEDIC SURGERY | Facility: CLINIC | Age: 73
End: 2023-10-30
Payer: MEDICARE

## 2023-10-30 VITALS — HEIGHT: 62 IN | WEIGHT: 150 LBS | BODY MASS INDEX: 27.6 KG/M2

## 2023-10-30 PROCEDURE — 99213 OFFICE O/P EST LOW 20 MIN: CPT | Mod: 25

## 2023-10-30 PROCEDURE — 20611 DRAIN/INJ JOINT/BURSA W/US: CPT | Mod: RT

## 2023-10-30 PROCEDURE — J3490M: CUSTOM | Mod: NC

## 2023-11-13 ENCOUNTER — APPOINTMENT (OUTPATIENT)
Dept: ORTHOPEDIC SURGERY | Facility: CLINIC | Age: 73
End: 2023-11-13
Payer: MEDICARE

## 2023-11-13 VITALS — WEIGHT: 150 LBS | HEIGHT: 62 IN | BODY MASS INDEX: 27.6 KG/M2

## 2023-11-13 PROCEDURE — 20611 DRAIN/INJ JOINT/BURSA W/US: CPT | Mod: RT

## 2023-11-13 PROCEDURE — 99213 OFFICE O/P EST LOW 20 MIN: CPT | Mod: 25

## 2024-01-08 ENCOUNTER — APPOINTMENT (OUTPATIENT)
Dept: ORTHOPEDIC SURGERY | Facility: CLINIC | Age: 74
End: 2024-01-08
Payer: MEDICARE

## 2024-01-08 VITALS — WEIGHT: 150 LBS | BODY MASS INDEX: 27.6 KG/M2 | HEIGHT: 62 IN

## 2024-01-08 DIAGNOSIS — M17.11 UNILATERAL PRIMARY OSTEOARTHRITIS, RIGHT KNEE: ICD-10-CM

## 2024-01-08 PROCEDURE — 99213 OFFICE O/P EST LOW 20 MIN: CPT

## 2024-01-08 NOTE — HISTORY OF PRESENT ILLNESS
[2] : 2 [0] : 0 [Dull/Aching] : dull/aching [Intermittent] : intermittent [de-identified] : PALMER CABRERA is a 73 year old F here for a follow up for the right knee. Pt reports that she is doing better since the last visit. She reports that she has felt improvement since receiving the gel injection at the last visit.  [] : no [FreeTextEntry1] : Right knee

## 2024-01-08 NOTE — PHYSICAL EXAM
[Right] : right knee [5___] : hamstring 5[unfilled]/5 [Negative] : negative Raeann's [] : no pain with varus stress [TWNoteComboBox7] : flexion 110 degrees [de-identified] : extension 3 degrees

## 2024-04-12 NOTE — ASSESSMENT
[FreeTextEntry1] : The patient was advised of the diagnosis. The natural history of the pathology was explained in full to the patient in layman's terms. All questions were answered. The risks and benefits of surgical and non-surgical treatment alternatives were explained in full to the patient.\par \par Pt was instructed to follow up if she develops and contracture.
30

## 2025-03-31 ENCOUNTER — APPOINTMENT (OUTPATIENT)
Dept: ORTHOPEDIC SURGERY | Facility: CLINIC | Age: 75
End: 2025-03-31

## 2025-03-31 DIAGNOSIS — M17.11 UNILATERAL PRIMARY OSTEOARTHRITIS, RIGHT KNEE: ICD-10-CM

## 2025-03-31 PROCEDURE — 20611 DRAIN/INJ JOINT/BURSA W/US: CPT | Mod: RT

## 2025-03-31 PROCEDURE — 99213 OFFICE O/P EST LOW 20 MIN: CPT | Mod: 25

## 2025-03-31 PROCEDURE — 73564 X-RAY EXAM KNEE 4 OR MORE: CPT | Mod: RT

## 2025-04-08 ENCOUNTER — APPOINTMENT (OUTPATIENT)
Dept: OTOLARYNGOLOGY | Facility: CLINIC | Age: 75
End: 2025-04-08
Payer: MEDICARE

## 2025-04-08 VITALS
OXYGEN SATURATION: 97 % | HEART RATE: 70 BPM | HEIGHT: 62 IN | WEIGHT: 155 LBS | BODY MASS INDEX: 28.52 KG/M2 | SYSTOLIC BLOOD PRESSURE: 103 MMHG | DIASTOLIC BLOOD PRESSURE: 67 MMHG

## 2025-04-08 DIAGNOSIS — L40.9 PSORIASIS, UNSPECIFIED: ICD-10-CM

## 2025-04-08 DIAGNOSIS — L29.9 PRURITUS, UNSPECIFIED: ICD-10-CM

## 2025-04-08 PROCEDURE — 99203 OFFICE O/P NEW LOW 30 MIN: CPT

## 2025-04-08 RX ORDER — HALOBETASOL PROPIONATE 0.5 MG/G
0.05 CREAM TOPICAL
Qty: 1 | Refills: 1 | Status: ACTIVE | COMMUNITY
Start: 2025-04-08 | End: 1900-01-01

## 2025-06-02 RX ORDER — CLOTRIMAZOLE AND BETAMETHASONE DIPROPIONATE 10; .5 MG/G; MG/G
1-0.05 CREAM TOPICAL TWICE DAILY
Qty: 3 | Refills: 1 | Status: ACTIVE | COMMUNITY
Start: 2025-06-02 | End: 1900-01-01

## 2025-09-11 ENCOUNTER — APPOINTMENT (OUTPATIENT)
Dept: ORTHOPEDIC SURGERY | Facility: CLINIC | Age: 75
End: 2025-09-11
Payer: MEDICARE

## 2025-09-11 VITALS — BODY MASS INDEX: 28.52 KG/M2 | HEIGHT: 62 IN | WEIGHT: 155 LBS

## 2025-09-11 PROCEDURE — 99213 OFFICE O/P EST LOW 20 MIN: CPT

## 2025-09-11 PROCEDURE — 73564 X-RAY EXAM KNEE 4 OR MORE: CPT | Mod: LT

## 2025-09-15 ENCOUNTER — APPOINTMENT (OUTPATIENT)
Dept: ORTHOPEDIC SURGERY | Facility: CLINIC | Age: 75
End: 2025-09-15
Payer: MEDICARE

## 2025-09-15 DIAGNOSIS — M17.12 UNILATERAL PRIMARY OSTEOARTHRITIS, LEFT KNEE: ICD-10-CM

## 2025-09-15 PROCEDURE — 20611 DRAIN/INJ JOINT/BURSA W/US: CPT | Mod: LT
